# Patient Record
Sex: MALE | Race: WHITE | Employment: FULL TIME | URBAN - METROPOLITAN AREA
[De-identification: names, ages, dates, MRNs, and addresses within clinical notes are randomized per-mention and may not be internally consistent; named-entity substitution may affect disease eponyms.]

---

## 2022-08-09 ENCOUNTER — OFFICE VISIT (OUTPATIENT)
Dept: NEUROLOGY | Age: 52
End: 2022-08-09
Payer: COMMERCIAL

## 2022-08-09 VITALS
HEIGHT: 74 IN | BODY MASS INDEX: 37.86 KG/M2 | SYSTOLIC BLOOD PRESSURE: 157 MMHG | WEIGHT: 295 LBS | DIASTOLIC BLOOD PRESSURE: 101 MMHG | HEART RATE: 62 BPM

## 2022-08-09 DIAGNOSIS — G56.03 CARPAL TUNNEL SYNDROME, BILATERAL: ICD-10-CM

## 2022-08-09 DIAGNOSIS — R20.2 PARESTHESIA OF BOTH HANDS: Primary | ICD-10-CM

## 2022-08-09 DIAGNOSIS — G56.21 LESION OF RIGHT ULNAR NERVE: ICD-10-CM

## 2022-08-09 DIAGNOSIS — G56.22 LESION OF LEFT ULNAR NERVE: ICD-10-CM

## 2022-08-09 PROCEDURE — 95913 NRV CNDJ TEST 13/> STUDIES: CPT | Performed by: PSYCHIATRY & NEUROLOGY

## 2022-08-09 PROCEDURE — 99202 OFFICE O/P NEW SF 15 MIN: CPT | Performed by: PSYCHIATRY & NEUROLOGY

## 2022-08-09 PROCEDURE — 95885 MUSC TST DONE W/NERV TST LIM: CPT | Performed by: PSYCHIATRY & NEUROLOGY

## 2022-08-09 ASSESSMENT — VISUAL ACUITY: OU: 1

## 2022-08-09 NOTE — PROGRESS NOTES
EMG/Nerve Conduction Study Procedure Note  [facility], [address] [place] [address] [place]   [phone]      Hx:    Exam:     45 y.o. RH male referred for EMG/NCV of the upper extremities r/o bilateral CTS. Patient reports 10-year hx of intermittent numbness and tingling in the hands. States 10 years ago he had a NCV study that demonstrated bilateral CTS. Hx of RIGHT shoulder arthroscopy 1/9/2020 following a work related injury. No hx of DM. Summary               needle EMG of the upper extremity selected muscles as below with conduction velocity upper extremity. Controlled environmental factors / EMG lab. Temperature. NCV : sensory segments:    Abnormal = the left median distal is no response/absent; the right median distal SCV is very slow with marked reduction of the SNAP amplitudes. Bilateral ulnar and bilateral radial SCB SNAP are normal.  NCV transcarpal sensory segments:     Abnormal = bilateral transcarpal segment abnormal = the left median transcarpal SNAP absent/no response with normal ulnar at the distal transcarpal; the right median severely slowed SCV with attenuated SNAP and normal right ulnar with some decrease of the SNAP amplitude. NCV Motor MCV segments:     Abnormal = bilateral median terminal latency delays and prolonged left at 8.54 ms right at 8.27 ms the upper limit of normal is 4.15 ms; the amplitudes are markedly reduced CMAP bilaterally; the bilateral proximal median nerve MCV are slowed mildly at the elbow. Also abnormal bilateral ulnar MCV worse on the right than the left slowing at the elbow segment without other changes. F-wave studies:        abnormal = bilateral median nerve F waves are significantly prolonged and delayed. The bilateral ulnar F waves are essentially normal.   NEEDLE EMG:   Tested muscles[de-identified]    Normal bilateral FCU FDI APB ADM =Normal insertional activity and interference pattern/recruitment.   No fasciculations fibrillations positive sharp waves.  Normal MUP. No BSS AP. No giant MUP. No myotonia. No upper motor neuron sign. 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 NO MYOPATHY MYOTONIA OR FASCICULATIONS. CONCLUSION:      Compatible with severe left and also moderately severe right carpal tunnel syndromes with also evidence for ulnar neuropathy at the elbows possibly from compression (positive history). Procedure Details:      compatible and correlative findings with the history and examination of bilateral carpal tunnel syndromes that are extremely severe on the left and severe on the right. In fact appears that previous recommendations for surgery greater than 5 to 10 years ago the CTS has worsened. Patient made fully aware he will follow-up with Dr. Jayy Nieves. Please Note[de-identified]     Data and waveforms * filed under Procedure category [facility]. See Procedure Files for complete data pages. Patient is referred by the following provider for consultation regarding as below:      [[Please note that the consultation is a separate note/E-M from the EMG procedure. ]]              Adore Villarreal MD  Consultative Neurology, Neurodiagnostics   [facility]    [address]  Phone:  [phone]  Fax:   [phone]          + + +   Glossary:   MUP: motor unit potential;  SNAP: sensory nerve action potential; Fibs:  Fibrillations; Fascic: fasciculations; IA: insertional activity;  IP: interference pattern;  SCV :  Sensory conduction velocity;  MCV: motor conduction velocity; NOTE[de-identified] muscles are abbreviated latin initials.      [place] raw datafile[de-identified]   * filed at Procedure or Media Files.  *

## 2022-08-09 NOTE — PROGRESS NOTES
Intimate Partner Violence: Not on file   Housing Stability: Not on file       Family History:   No family history on file. Medications:    No current outpatient medications on file. No Known Allergies    Review of Systems:  Review of Systems   Constitutional: Negative. HENT: Negative. Skin: Negative. Neurological:  Positive for tingling and sensory change. Negative for dizziness, tremors, speech change, focal weakness, seizures, loss of consciousness, weakness and headaches. Hands. All other systems reviewed and are negative. Extended / Orthostatic Vitals:    Vitals:    08/09/22 1035   BP: (!) 157/101   Site: Left Upper Arm   Pulse: 62   Weight: 295 lb (133.8 kg)   Height: 6' 2\" (1.88 m)        Physical Exam  Vitals reviewed. Nursing note reviewed: No atrophy. No fasciculations. .  Constitutional:       General: He is awake. He is not in acute distress. Appearance: He is well-developed and well-groomed. He is not ill-appearing, toxic-appearing or diaphoretic. HENT:      Head: Normocephalic and atraumatic. No raccoon eyes, abrasion, contusion, right periorbital erythema or left periorbital erythema. Right Ear: Hearing normal.      Left Ear: Hearing normal.   Eyes:      General: Lids are normal. Vision grossly intact. Extraocular Movements: Extraocular movements intact. Conjunctiva/sclera: Conjunctivae normal.   Neck:      Trachea: Phonation normal.   Pulmonary:      Effort: Pulmonary effort is normal. No respiratory distress. Breath sounds: No wheezing. Musculoskeletal:         General: Normal range of motion. Cervical back: No torticollis. Skin:     General: Skin is warm and dry. Capillary Refill: Capillary refill takes less than 2 seconds. Coloration: Skin is not cyanotic, jaundiced or pale. Findings: No bruising or erythema. Nails: There is no clubbing. Neurological:      Mental Status: He is alert and easily aroused. Mental status is at baseline. Cranial Nerves: No cranial nerve deficit, dysarthria or facial asymmetry. Sensory: Sensory deficit (Subjective.) present. Motor: Motor function is intact. No weakness, tremor, atrophy, abnormal muscle tone or seizure activity. Coordination: Coordination is intact. Coordination normal.      Gait: Gait is intact. Gait normal.      Comments: PERRLA. No Chelsea sign. EOMI. No ERICA. Psychiatric:         Attention and Perception: Attention normal.         Mood and Affect: Mood normal.         Speech: Speech normal.         Behavior: Behavior normal. Behavior is cooperative. Neurologic Exam     Mental Status   Attention: normal. Concentration: normal.   Speech: speech is normal   Level of consciousness: alert  Knowledge: good. Normal comprehension. Gait, Coordination, and Reflexes     Gait  Gait: normal    Tremor   Resting tremor: absent  Intention tremor: absent  Action tremor: absent    Reflexes   Right Bardales: absent  Left Bardales: absentNo Chelsea. No Ladan. No spasticity or rigidity. No fasciculations or atrophy. There is no tic, twitch, tonic or clonic activity noted. No dyskinesia. Assessment   Assessment / Plan:    Sawyer Maurer was seen today for numbness and other. Diagnoses and all orders for this visit:    Paresthesia of both hands  -     Motor &/sens 13/> nerve conduction test  -     Needle EMG w/ nerve conduction test, limited    Carpal tunnel syndrome, bilateral  -     Motor &/sens 13/> nerve conduction test  -     Needle EMG w/ nerve conduction test, limited    Lesion of right ulnar nerve  -     Motor &/sens 13/> nerve conduction test  -     Needle EMG w/ nerve conduction test, limited    Lesion of left ulnar nerve  -     Motor &/sens 13/> nerve conduction test  -     Needle EMG w/ nerve conduction test, limited    No new therapeutic except = =      EMG reveals severe bilateral carpal tunnel syndromes worse on the left. There is also early slowing of the ulnar nerve at the elbows of a mild degree. Consistent with a history of resting elbows at work. The Diagnosis and differential diagnostic considerations, and Rx Tx were reviewed with the patient at length. Pt made aware. I have spent greater than 50% of visit discussing and counseling of patient 16 min visit for treatment and diagnostic plan review beyond EMG timing *. Patient is referred by the following provider for consultation regarding as below:      [[Please note that the consultation is a separate note/E-M from the EMG procedure. ]]                  More than 50% of this visit  time was spent in counseling and care coordination. The above time includes pre-  and post- face-face time in records review, and preparation including available pertinent images and reports. Notes: Patient is to continue all medications as directed by prescribing physicians. Continuations on today's visit are made based on the patient's report of current medications. Patient acknowledges the above examination and reviews. Current Meds Verified: Current meds/immunizations reviewed, including purpose with pt. Med Recon list given to pt/family. Pt advised to discard old med lists and provide all providers with current list at each visit and carry list with them in case of emergency. [ *NOTE:  parts or all of this consultation are produced using artificial voice recognition software.   Some speech errors are inherent in such software and may be included in the produced record. ]                Leena Ford MD  Consultative Neurology, [address] [facility] [address] [place] [address]  Phone:  [phone]  Fax:   [phone]

## 2024-02-20 ENCOUNTER — HOSPITAL ENCOUNTER (EMERGENCY)
Age: 54
Discharge: HOME | End: 2024-02-20
Attending: EMERGENCY MEDICINE
Payer: COMMERCIAL

## 2024-02-20 ENCOUNTER — APPOINTMENT (OUTPATIENT)
Dept: GENERAL RADIOLOGY | Age: 54
End: 2024-02-20
Payer: COMMERCIAL

## 2024-02-20 VITALS
DIASTOLIC BLOOD PRESSURE: 105 MMHG | RESPIRATION RATE: 18 BRPM | WEIGHT: 315 LBS | OXYGEN SATURATION: 96 % | BODY MASS INDEX: 40.43 KG/M2 | HEIGHT: 74 IN | SYSTOLIC BLOOD PRESSURE: 164 MMHG | TEMPERATURE: 98.3 F | HEART RATE: 76 BPM

## 2024-02-20 DIAGNOSIS — R07.9 CHEST PAIN, UNSPECIFIED TYPE: Primary | ICD-10-CM

## 2024-02-20 LAB
ANION GAP SERPL CALC-SCNC: 12 MMOL/L (ref 2–11)
BASOPHILS # BLD: 0.1 K/UL (ref 0–0.2)
BASOPHILS NFR BLD: 1 % (ref 0–2)
BUN SERPL-MCNC: 15 MG/DL (ref 6–23)
CALCIUM SERPL-MCNC: 9.3 MG/DL (ref 8.3–10.4)
CHLORIDE SERPL-SCNC: 106 MMOL/L (ref 98–107)
CO2 SERPL-SCNC: 25 MMOL/L (ref 21–32)
CREAT SERPL-MCNC: 0.99 MG/DL (ref 0.8–1.5)
DIFFERENTIAL METHOD BLD: ABNORMAL
EOSINOPHIL # BLD: 0.4 K/UL (ref 0–0.8)
EOSINOPHIL NFR BLD: 3 % (ref 0.5–7.8)
ERYTHROCYTE [DISTWIDTH] IN BLOOD BY AUTOMATED COUNT: 13.6 % (ref 11.9–14.6)
GLUCOSE SERPL-MCNC: 110 MG/DL (ref 65–100)
HCT VFR BLD AUTO: 41.3 % (ref 41.1–50.3)
HGB BLD-MCNC: 13.6 G/DL (ref 13.6–17.2)
IMM GRANULOCYTES # BLD AUTO: 0.1 K/UL (ref 0–0.5)
IMM GRANULOCYTES NFR BLD AUTO: 1 % (ref 0–5)
LYMPHOCYTES # BLD: 3.3 K/UL (ref 0.5–4.6)
LYMPHOCYTES NFR BLD: 30 % (ref 13–44)
MCH RBC QN AUTO: 25.9 PG (ref 26.1–32.9)
MCHC RBC AUTO-ENTMCNC: 32.9 G/DL (ref 31.4–35)
MCV RBC AUTO: 78.5 FL (ref 82–102)
MONOCYTES # BLD: 0.9 K/UL (ref 0.1–1.3)
MONOCYTES NFR BLD: 8 % (ref 4–12)
NEUTS SEG # BLD: 6.4 K/UL (ref 1.7–8.2)
NEUTS SEG NFR BLD: 58 % (ref 43–78)
NRBC # BLD: 0 K/UL (ref 0–0.2)
PLATELET # BLD AUTO: 270 K/UL (ref 150–450)
PMV BLD AUTO: 9.2 FL (ref 9.4–12.3)
POTASSIUM SERPL-SCNC: 4 MMOL/L (ref 3.5–5.1)
RBC # BLD AUTO: 5.26 M/UL (ref 4.23–5.6)
SODIUM SERPL-SCNC: 143 MMOL/L (ref 133–143)
TROPONIN T SERPL HS-MCNC: 35.6 NG/L (ref 0–22)
TROPONIN T SERPL HS-MCNC: 36.9 NG/L (ref 0–22)
WBC # BLD AUTO: 11.1 K/UL (ref 4.3–11.1)

## 2024-02-20 PROCEDURE — 84484 ASSAY OF TROPONIN QUANT: CPT

## 2024-02-20 PROCEDURE — 99285 EMERGENCY DEPT VISIT HI MDM: CPT

## 2024-02-20 PROCEDURE — 85025 COMPLETE CBC W/AUTO DIFF WBC: CPT

## 2024-02-20 PROCEDURE — 93005 ELECTROCARDIOGRAM TRACING: CPT | Performed by: EMERGENCY MEDICINE

## 2024-02-20 PROCEDURE — 71046 X-RAY EXAM CHEST 2 VIEWS: CPT

## 2024-02-20 PROCEDURE — 80048 BASIC METABOLIC PNL TOTAL CA: CPT

## 2024-02-20 RX ORDER — LORAZEPAM 1 MG/1
1 TABLET ORAL 3 TIMES DAILY
COMMUNITY
Start: 2023-08-30

## 2024-02-20 RX ORDER — AMLODIPINE BESYLATE AND BENAZEPRIL HYDROCHLORIDE 5; 10 MG/1; MG/1
1 CAPSULE ORAL DAILY
COMMUNITY
Start: 2022-12-21

## 2024-02-20 RX ORDER — PREGABALIN 150 MG/1
150 CAPSULE ORAL 2 TIMES DAILY
COMMUNITY
Start: 2023-08-30

## 2024-02-20 ASSESSMENT — PAIN DESCRIPTION - LOCATION: LOCATION: CHEST

## 2024-02-20 ASSESSMENT — PAIN - FUNCTIONAL ASSESSMENT
PAIN_FUNCTIONAL_ASSESSMENT: 0-10
PAIN_FUNCTIONAL_ASSESSMENT: ACTIVITIES ARE NOT PREVENTED

## 2024-02-20 ASSESSMENT — PAIN SCALES - GENERAL
PAINLEVEL_OUTOF10: 2
PAINLEVEL_OUTOF10: 5

## 2024-02-20 ASSESSMENT — PAIN DESCRIPTION - DESCRIPTORS: DESCRIPTORS: ACHING;PRESSURE

## 2024-02-20 ASSESSMENT — PAIN DESCRIPTION - PAIN TYPE: TYPE: ACUTE PAIN

## 2024-02-20 ASSESSMENT — LIFESTYLE VARIABLES: HOW MANY STANDARD DRINKS CONTAINING ALCOHOL DO YOU HAVE ON A TYPICAL DAY: PATIENT DOES NOT DRINK

## 2024-02-20 ASSESSMENT — PAIN DESCRIPTION - ORIENTATION: ORIENTATION: MID

## 2024-02-20 NOTE — ED TRIAGE NOTES
Chest pain since Sunday.  States that the pain intensified this afternoon.  States that he has had some nause

## 2024-02-20 NOTE — ED PROVIDER NOTES
Differential Type AUTOMATED      Neutrophils % 58 43 - 78 %    Lymphocytes % 30 13 - 44 %    Monocytes % 8 4.0 - 12.0 %    Eosinophils % 3 0.5 - 7.8 %    Basophils % 1 0.0 - 2.0 %    Immature Granulocytes 1 0.0 - 5.0 %    Neutrophils Absolute 6.4 1.7 - 8.2 K/UL    Lymphocytes Absolute 3.3 0.5 - 4.6 K/UL    Monocytes Absolute 0.9 0.1 - 1.3 K/UL    Eosinophils Absolute 0.4 0.0 - 0.8 K/UL    Basophils Absolute 0.1 0.0 - 0.2 K/UL    Absolute Immature Granulocyte 0.1 0.0 - 0.5 K/UL   Troponin T   Result Value Ref Range    Troponin T 36.9 (H) 0 - 22 ng/L   Troponin T   Result Value Ref Range    Troponin T 35.6 (H) 0 - 22 ng/L         XR CHEST (2 VW)   Final Result   No acute findings in the chest                      No results for input(s): \"COVID19\" in the last 72 hours.     Voice dictation software was used during the making of this note.  This software is not perfect and grammatical and other typographical errors may be present.  This note has not been completely proofread for errors.       Ross Raphael MD  02/20/24 1975

## 2024-02-20 NOTE — ED NOTES
I have reviewed discharge instructions with the patient.  The patient verbalized understanding.    Patient left ED via Discharge Method: ambulatory to Home with self.    Opportunity for questions and clarification provided.       Patient given 0 scripts.         To continue your aftercare when you leave the hospital, you may receive an automated call from our care team to check in on how you are doing.  This is a free service and part of our promise to provide the best care and service to meet your aftercare needs.” If you have questions, or wish to unsubscribe from this service please call [phone].  Thank you for Choosing our [facility] Emergency Department.

## 2024-02-21 LAB
EKG ATRIAL RATE: 83 BPM
EKG DIAGNOSIS: NORMAL
EKG P AXIS: 9 DEGREES
EKG P-R INTERVAL: 147 MS
EKG Q-T INTERVAL: 384 MS
EKG QRS DURATION: 102 MS
EKG QTC CALCULATION (BAZETT): 452 MS
EKG R AXIS: 49 DEGREES
EKG T AXIS: 43 DEGREES
EKG VENTRICULAR RATE: 83 BPM

## 2024-02-21 PROCEDURE — 93010 ELECTROCARDIOGRAM REPORT: CPT | Performed by: INTERNAL MEDICINE

## 2024-03-07 ENCOUNTER — INITIAL CONSULT (OUTPATIENT)
Age: 54
End: 2024-03-07
Payer: COMMERCIAL

## 2024-03-07 VITALS
HEIGHT: 74 IN | BODY MASS INDEX: 40.3 KG/M2 | DIASTOLIC BLOOD PRESSURE: 88 MMHG | WEIGHT: 314 LBS | HEART RATE: 92 BPM | SYSTOLIC BLOOD PRESSURE: 120 MMHG

## 2024-03-07 DIAGNOSIS — R79.89 ELEVATED TROPONIN: ICD-10-CM

## 2024-03-07 DIAGNOSIS — E66.01 CLASS 3 SEVERE OBESITY DUE TO EXCESS CALORIES WITHOUT SERIOUS COMORBIDITY WITH BODY MASS INDEX (BMI) OF 40.0 TO 44.9 IN ADULT (HCC): Chronic | ICD-10-CM

## 2024-03-07 DIAGNOSIS — R07.89 OTHER CHEST PAIN: Primary | ICD-10-CM

## 2024-03-07 DIAGNOSIS — I10 PRIMARY HYPERTENSION: Chronic | ICD-10-CM

## 2024-03-07 PROCEDURE — 99244 OFF/OP CNSLTJ NEW/EST MOD 40: CPT | Performed by: INTERNAL MEDICINE

## 2024-03-07 PROCEDURE — 3079F DIAST BP 80-89 MM HG: CPT | Performed by: INTERNAL MEDICINE

## 2024-03-07 PROCEDURE — 3074F SYST BP LT 130 MM HG: CPT | Performed by: INTERNAL MEDICINE

## 2024-03-07 ASSESSMENT — ENCOUNTER SYMPTOMS: SHORTNESS OF BREATH: 0

## 2024-03-07 NOTE — PROGRESS NOTES
Nuclear stress test with myocardial perfusion; Future  Class 3 severe obesity due to excess calories without serious comorbidity with body mass index (BMI) of 40.0 to 44.9 in adult (HCC)  - BMI 40.32. Discussed diet and weight loss to help with this.  Primary hypertension  - BP acceptable. Continue current regimen.    Return if symptoms worsen or fail to improve.       Thank you for allowing me to participate in this patient's care.  Please call or contact me if there are any questions or concerns regarding the above.      Asif Pinon DO  03/07/24  12:21 PM      Proofread, but unrecognized errors may exist.

## 2024-03-18 ENCOUNTER — TELEPHONE (OUTPATIENT)
Age: 54
End: 2024-03-18

## 2024-03-19 NOTE — TELEPHONE ENCOUNTER
I spoke to the patient. He saw Dr Pinon for Consult on 3/7/24. Dr Pinon gave him a note stating he can return to work on 3/11/24 with no restrictions. He also ordered a Nuclear Stress Test which is scheduled for 4/8/24 and  then a follow up with him in 4/10/24.     Patient states [employer] will not allow him to work while tests are pending. He said they consider any pending tests a restriction and [employer] does not allow employees to return to work with ANY restrictions. He will have to be cleared to return to work with no restrictions and no tests pending.     Patient dropped off FMLA forms required by [employer] to cover the patient until he can return to work after all testing is completed and he is cleared by Dr Pinon.

## 2024-03-19 NOTE — TELEPHONE ENCOUNTER
Asif Pinon, DO  You2 hours ago (11:58 AM)       Okay thank you, I can fill out paperwork. Also can move up the stress test sooner if needed. Thanks.

## 2024-03-19 NOTE — TELEPHONE ENCOUNTER
Forms completed and signed by Dr Pinon. Patient is scheduled for a 2 day Nuclear Stress Test 4/8/24 & 4/9/24. This is the first available date for a 2 day Stress Test. Dr Pinon was notified.     Pt requested to  the forms and states he will download  send directly to the [facility]. Forms left at the  for him to . A copy was also sent to medical records to be scanned to the pt's chart.

## 2024-04-10 ENCOUNTER — OFFICE VISIT (OUTPATIENT)
Age: 54
End: 2024-04-10
Payer: COMMERCIAL

## 2024-04-10 VITALS
WEIGHT: 314.8 LBS | BODY MASS INDEX: 40.4 KG/M2 | HEART RATE: 75 BPM | HEIGHT: 74 IN | DIASTOLIC BLOOD PRESSURE: 66 MMHG | SYSTOLIC BLOOD PRESSURE: 138 MMHG

## 2024-04-10 DIAGNOSIS — I10 PRIMARY HYPERTENSION: Chronic | ICD-10-CM

## 2024-04-10 DIAGNOSIS — R07.89 OTHER CHEST PAIN: Primary | ICD-10-CM

## 2024-04-10 DIAGNOSIS — E66.01 CLASS 3 SEVERE OBESITY DUE TO EXCESS CALORIES WITHOUT SERIOUS COMORBIDITY WITH BODY MASS INDEX (BMI) OF 40.0 TO 44.9 IN ADULT (HCC): Chronic | ICD-10-CM

## 2024-04-10 PROCEDURE — 99214 OFFICE O/P EST MOD 30 MIN: CPT | Performed by: INTERNAL MEDICINE

## 2024-04-10 PROCEDURE — 3075F SYST BP GE 130 - 139MM HG: CPT | Performed by: INTERNAL MEDICINE

## 2024-04-10 PROCEDURE — 3078F DIAST BP <80 MM HG: CPT | Performed by: INTERNAL MEDICINE

## 2024-04-10 ASSESSMENT — ENCOUNTER SYMPTOMS: SHORTNESS OF BREATH: 0

## 2024-04-10 NOTE — PROGRESS NOTES
[facility] CARDIOLOGY  [address] [place] [address], [place] [address]  PHONE: [phone]      04/10/24    NAME:  Rupesh Gonzales  : 1970  MRN: [id number]         SUBJECTIVE:   Rupesh Gonzales is a 54 y.o. male seen for a follow up visit regarding the following:     No chief complaint on file.           HPI:  Follow up  No chief complaint on file.   .      54 y.o. male with PMH HTN presenting for follow up evaluation of chest pain. No further episodes of chest pain. No other acute complaints.        Cardiac Medications       Antihypertensive Combinations       amLODIPine-benazepril (LOTREL) 5-10 MG per capsule Take 1 capsule by mouth daily                  Past Medical History, Past Surgical History, Family history, Social History, and Medications were all reviewed with the patient today and updated as necessary.     Prior to Admission medications    Medication Sig Start Date End Date Taking? Authorizing Provider   amLODIPine-benazepril (LOTREL) 5-10 MG per capsule Take 1 capsule by mouth daily 22   ProviderScott MD   LORazepam (ATIVAN) 1 MG tablet Take 1 tablet by mouth as needed. 23   Scott Hill MD   pregabalin (LYRICA) 150 MG capsule Take 1 capsule by mouth 2 times daily. 23   ProviderScott MD     Allergies   Allergen Reactions    Nitrofurantoin Other (See Comments) and Nausea And Vomiting    Chlorhexidine Rash     Rash all over upper body.    Macrolides And Ketolides Nausea And Vomiting     Past Medical History:   Diagnosis Date    Hypertension      Past Surgical History:   Procedure Laterality Date    JOINT REPLACEMENT Right     SHOULDER OPEN ROTATOR CUFF REPAIR       Family History   Problem Relation Age of Onset    Heart Disease Father      Social History     Tobacco Use    Smoking status: Never    Smokeless tobacco: Never   Substance Use Topics    Alcohol use: Not Currently       ROS:    Review of Systems   Cardiovascular:  Negative for